# Patient Record
Sex: MALE | Race: OTHER | NOT HISPANIC OR LATINO | URBAN - METROPOLITAN AREA
[De-identification: names, ages, dates, MRNs, and addresses within clinical notes are randomized per-mention and may not be internally consistent; named-entity substitution may affect disease eponyms.]

---

## 2021-09-10 ENCOUNTER — EMERGENCY (EMERGENCY)
Facility: HOSPITAL | Age: 36
LOS: 1 days | Discharge: HOME | End: 2021-09-10
Attending: EMERGENCY MEDICINE | Admitting: EMERGENCY MEDICINE
Payer: SELF-PAY

## 2021-09-10 VITALS
WEIGHT: 194.01 LBS | OXYGEN SATURATION: 98 % | RESPIRATION RATE: 18 BRPM | HEIGHT: 72.05 IN | TEMPERATURE: 101 F | HEART RATE: 102 BPM | DIASTOLIC BLOOD PRESSURE: 89 MMHG | SYSTOLIC BLOOD PRESSURE: 141 MMHG

## 2021-09-10 DIAGNOSIS — U07.1 COVID-19: ICD-10-CM

## 2021-09-10 DIAGNOSIS — R50.9 FEVER, UNSPECIFIED: ICD-10-CM

## 2021-09-10 LAB
ALBUMIN SERPL ELPH-MCNC: 4.6 G/DL — SIGNIFICANT CHANGE UP (ref 3.3–5)
ALP SERPL-CCNC: 80 U/L — SIGNIFICANT CHANGE UP (ref 40–120)
ALT FLD-CCNC: 22 U/L — SIGNIFICANT CHANGE UP (ref 10–45)
ANION GAP SERPL CALC-SCNC: 10 MMOL/L — SIGNIFICANT CHANGE UP (ref 5–17)
APPEARANCE UR: CLEAR — SIGNIFICANT CHANGE UP
AST SERPL-CCNC: 24 U/L — SIGNIFICANT CHANGE UP (ref 10–40)
BASOPHILS # BLD AUTO: 0.02 K/UL — SIGNIFICANT CHANGE UP (ref 0–0.2)
BASOPHILS NFR BLD AUTO: 0.3 % — SIGNIFICANT CHANGE UP (ref 0–2)
BILIRUB SERPL-MCNC: 0.3 MG/DL — SIGNIFICANT CHANGE UP (ref 0.2–1.2)
BILIRUB UR-MCNC: NEGATIVE — SIGNIFICANT CHANGE UP
BUN SERPL-MCNC: 23 MG/DL — SIGNIFICANT CHANGE UP (ref 7–23)
CALCIUM SERPL-MCNC: 9.3 MG/DL — SIGNIFICANT CHANGE UP (ref 8.4–10.5)
CHLORIDE SERPL-SCNC: 103 MMOL/L — SIGNIFICANT CHANGE UP (ref 96–108)
CO2 SERPL-SCNC: 23 MMOL/L — SIGNIFICANT CHANGE UP (ref 22–31)
COLOR SPEC: YELLOW — SIGNIFICANT CHANGE UP
CREAT SERPL-MCNC: 1.07 MG/DL — SIGNIFICANT CHANGE UP (ref 0.5–1.3)
CRP SERPL-MCNC: 9.6 MG/L — HIGH (ref 0–4)
D DIMER BLD IA.RAPID-MCNC: <150 NG/ML DDU — SIGNIFICANT CHANGE UP
DIFF PNL FLD: NEGATIVE — SIGNIFICANT CHANGE UP
EOSINOPHIL # BLD AUTO: 0.01 K/UL — SIGNIFICANT CHANGE UP (ref 0–0.5)
EOSINOPHIL NFR BLD AUTO: 0.1 % — SIGNIFICANT CHANGE UP (ref 0–6)
FERRITIN SERPL-MCNC: 280 NG/ML — SIGNIFICANT CHANGE UP (ref 30–400)
GLUCOSE SERPL-MCNC: 88 MG/DL — SIGNIFICANT CHANGE UP (ref 70–99)
GLUCOSE UR QL: NEGATIVE — SIGNIFICANT CHANGE UP
HCT VFR BLD CALC: 45.3 % — SIGNIFICANT CHANGE UP (ref 39–50)
HGB BLD-MCNC: 15.1 G/DL — SIGNIFICANT CHANGE UP (ref 13–17)
IMM GRANULOCYTES NFR BLD AUTO: 0.5 % — SIGNIFICANT CHANGE UP (ref 0–1.5)
KETONES UR-MCNC: 15 MG/DL
LEUKOCYTE ESTERASE UR-ACNC: NEGATIVE — SIGNIFICANT CHANGE UP
LYMPHOCYTES # BLD AUTO: 0.82 K/UL — LOW (ref 1–3.3)
LYMPHOCYTES # BLD AUTO: 10.3 % — LOW (ref 13–44)
MCHC RBC-ENTMCNC: 29.9 PG — SIGNIFICANT CHANGE UP (ref 27–34)
MCHC RBC-ENTMCNC: 33.3 GM/DL — SIGNIFICANT CHANGE UP (ref 32–36)
MCV RBC AUTO: 89.7 FL — SIGNIFICANT CHANGE UP (ref 80–100)
MONOCYTES # BLD AUTO: 1.3 K/UL — HIGH (ref 0–0.9)
MONOCYTES NFR BLD AUTO: 16.4 % — HIGH (ref 2–14)
NEUTROPHILS # BLD AUTO: 5.76 K/UL — SIGNIFICANT CHANGE UP (ref 1.8–7.4)
NEUTROPHILS NFR BLD AUTO: 72.4 % — SIGNIFICANT CHANGE UP (ref 43–77)
NITRITE UR-MCNC: NEGATIVE — SIGNIFICANT CHANGE UP
NRBC # BLD: 0 /100 WBCS — SIGNIFICANT CHANGE UP (ref 0–0)
PH UR: 6 — SIGNIFICANT CHANGE UP (ref 5–8)
PLATELET # BLD AUTO: 177 K/UL — SIGNIFICANT CHANGE UP (ref 150–400)
POTASSIUM SERPL-MCNC: 4.4 MMOL/L — SIGNIFICANT CHANGE UP (ref 3.5–5.3)
POTASSIUM SERPL-SCNC: 4.4 MMOL/L — SIGNIFICANT CHANGE UP (ref 3.5–5.3)
PROCALCITONIN SERPL-MCNC: 0.1 NG/ML — SIGNIFICANT CHANGE UP (ref 0.02–0.1)
PROT SERPL-MCNC: 7.8 G/DL — SIGNIFICANT CHANGE UP (ref 6–8.3)
PROT UR-MCNC: NEGATIVE MG/DL — SIGNIFICANT CHANGE UP
RBC # BLD: 5.05 M/UL — SIGNIFICANT CHANGE UP (ref 4.2–5.8)
RBC # FLD: 12.9 % — SIGNIFICANT CHANGE UP (ref 10.3–14.5)
SODIUM SERPL-SCNC: 136 MMOL/L — SIGNIFICANT CHANGE UP (ref 135–145)
SP GR SPEC: 1.02 — SIGNIFICANT CHANGE UP (ref 1–1.03)
UROBILINOGEN FLD QL: 0.2 E.U./DL — SIGNIFICANT CHANGE UP
WBC # BLD: 7.95 K/UL — SIGNIFICANT CHANGE UP (ref 3.8–10.5)
WBC # FLD AUTO: 7.95 K/UL — SIGNIFICANT CHANGE UP (ref 3.8–10.5)

## 2021-09-10 PROCEDURE — 71045 X-RAY EXAM CHEST 1 VIEW: CPT | Mod: 26

## 2021-09-10 PROCEDURE — 99285 EMERGENCY DEPT VISIT HI MDM: CPT | Mod: 25

## 2021-09-10 PROCEDURE — 85025 COMPLETE CBC W/AUTO DIFF WBC: CPT

## 2021-09-10 PROCEDURE — 0225U NFCT DS DNA&RNA 21 SARSCOV2: CPT

## 2021-09-10 PROCEDURE — 93010 ELECTROCARDIOGRAM REPORT: CPT

## 2021-09-10 PROCEDURE — U0005: CPT

## 2021-09-10 PROCEDURE — 71045 X-RAY EXAM CHEST 1 VIEW: CPT

## 2021-09-10 PROCEDURE — U0003: CPT

## 2021-09-10 PROCEDURE — 82728 ASSAY OF FERRITIN: CPT

## 2021-09-10 PROCEDURE — 84145 PROCALCITONIN (PCT): CPT

## 2021-09-10 PROCEDURE — 93005 ELECTROCARDIOGRAM TRACING: CPT

## 2021-09-10 PROCEDURE — 86140 C-REACTIVE PROTEIN: CPT

## 2021-09-10 PROCEDURE — 36415 COLL VENOUS BLD VENIPUNCTURE: CPT

## 2021-09-10 PROCEDURE — 85379 FIBRIN DEGRADATION QUANT: CPT

## 2021-09-10 PROCEDURE — 81003 URINALYSIS AUTO W/O SCOPE: CPT

## 2021-09-10 PROCEDURE — 80053 COMPREHEN METABOLIC PANEL: CPT

## 2021-09-10 RX ORDER — SODIUM CHLORIDE 9 MG/ML
1000 INJECTION INTRAMUSCULAR; INTRAVENOUS; SUBCUTANEOUS ONCE
Refills: 0 | Status: COMPLETED | OUTPATIENT
Start: 2021-09-10 | End: 2021-09-10

## 2021-09-10 RX ORDER — IBUPROFEN 200 MG
600 TABLET ORAL ONCE
Refills: 0 | Status: COMPLETED | OUTPATIENT
Start: 2021-09-10 | End: 2021-09-10

## 2021-09-10 RX ORDER — ACETAMINOPHEN 500 MG
650 TABLET ORAL ONCE
Refills: 0 | Status: COMPLETED | OUTPATIENT
Start: 2021-09-10 | End: 2021-09-10

## 2021-09-10 RX ADMIN — SODIUM CHLORIDE 1000 MILLILITER(S): 9 INJECTION INTRAMUSCULAR; INTRAVENOUS; SUBCUTANEOUS at 21:04

## 2021-09-10 RX ADMIN — Medication 600 MILLIGRAM(S): at 23:02

## 2021-09-10 RX ADMIN — Medication 650 MILLIGRAM(S): at 21:04

## 2021-09-10 NOTE — ED PROVIDER NOTE - OBJECTIVE STATEMENT
35 y/o M pt with no pertinent PMHx and PSHx presents to ED c/o fever today. Pt also has congestion, but denies any cough or shortness of breath. He was exposed to someone who was positive for covid last recently, and reports being vaccinated for covid last Tuesday with [employer]. Pt is febrile in ED, but ao x 3 and not in any distress.

## 2021-09-10 NOTE — ED PROVIDER NOTE - NSFOLLOWUPINSTRUCTIONS_ED_ALL_ED_FT
Síndrome viral  LO QUE NECESITAS SABER:  El síndrome viral es un término que se usa para los síntomas de [place] infección causada por un virus. Los virus se transmiten fácilmente de persona a persona a través del aire y en elementos compartidos.  INSTRUCCIONES DE DESCARGA:  Llame a ashby número de emergencia local (911 en los [place] O pídale a otra persona que llame si:  • Tiene convulsiones.  • No te pueden despertar.  • Tiene dolor de pecho o dificultad para respirar.  Busque atención médica de inmediato si:  • Tiene rigidez en el srinivasan, dolor de fabio intenso y sensibilidad a la belle.  • Se siente débil, mareado o confundido.  • Gris de orinar o orina mucho menos de lo habitual.  • Tose earl o [place] mucosidad espesa de color amarillo o mami.  • Tiene dolor abdominal severo o ashby abdomen es más valdo de lo normal.    Llame a ashby médico si:  • Sonia síntomas no mejoran con el tratamiento o empeoran después de 3 días.  • Tiene sarpullido o dolor de oído.  • Tiene ardor al orinar.  • Tiene preguntas o inquietudes sobre ashby afección o atención.  Medicamentos: es posible que necesite alguno de los siguientes:  • El acetaminofén reduce el dolor y la fiebre. Está disponible sin receta médica. Pregunte cuánto debe de y con qué frecuencia. Seguir direcciones. Simone las etiquetas de todos los demás medicamentos que esté usando para robson si también contienen acetaminofén, o pregunte a ashby médico o farmacéutico. El acetaminofén puede causar daño hepático si no se melissa correctamente. No use más de 4 gramos (4,000 miligramos) en total de acetaminofén en un día.  • Los SG, angela el ibuprofeno, ayudan a disminuir la hinchazón, el dolor y la fiebre. Los SG pueden causar hemorragia estomacal o problemas renales en determinadas personas. Si melissa medicamentos anticoagulantes, pregúntele siempre a ashby proveedor de atención médica si los SG son seguros para usted. Siempre simone la etiqueta del medicamento y siga las instrucciones.  • Los medicamentos para el resfriado ayudan a disminuir la hinchazón, controlar la tos y aliviar la congestión nasal o del pecho.  • El aerosol nasal salino ayuda a disminuir la congestión nasal.  • [place] ashby medicamento según las indicaciones. Comuníquese con ashby proveedor de atención médica si leno que ashby medicamento no le está ayudando o si tiene efectos secundarios. Dígale de grabiel si es alérgico a algún medicamento. Mantenga [place] lista de los medicamentos, vitaminas y hierbas que melissa. Incluya las cantidades, cuándo y por qué las melissa. Lleve la lista o los frascos de pastillas a las visitas de seguimiento. Lleve consigo ashby lista de medicamentos en armando de [place] emergencia.    Maneje sonia síntomas:  • Aruna líquidos según las indicaciones para prevenir la deshidratación. Pregunte cuánto líquido debe beber cada día y qué líquidos son mejores para usted. Pregunte si debe beber [place] solución de rehidratación oral (SRO). [place] SRO tiene las cantidades adecuadas de agua, sales y azúcar que necesita para reemplazar los fluidos corporales. [place] puede ayudar a prevenir la deshidratación causada por vómitos o diarrea. No aruna líquidos con cafeína. Los líquidos con cafeína pueden empeorar la deshidratación.  • Descanse lo suficiente para ayudar a ashby cuerpo a sanar. Melissa siestas ly todo el día. Pregúntele a ashby proveedor de atención médica cuándo puede regresar al trabajo y a sonia actividades normales.  • Use un humidificador de vapor frío para ayudarlo a respirar mejor. Pregúntele a ashby proveedor de atención médica cómo usar un humidificador de vapor frío.  • Coma miel o use pastillas para la tos para el dolor de garganta. Las pastillas para la tos están disponibles sin receta médica. Siga las instrucciones para de pastillas para la tos.  • No fume ni se acerque a nadie que esté fumando. La nicotina y otras sustancias químicas de los cigarrillos y puros pueden causar daño pulmonar. Fumar también puede retrasar la curación. Pídale información a ashby proveedor de atención médica si actualmente fuma y necesita ayuda para dejar de fumar. Los cigarrillos electrónicos o el tabaco sin humo todavía contienen nicotina. Hable con ashby proveedor de atención médica antes de usar estos productos.  Prevenga la propagación de gérmenes:  • Lávese las bhupinder con frecuencia. Lávese las bhupinder varias veces al día. Lávese después de ir al baño, cambie el pañal de un mare y antes de preparar o comer alimentos. Use agua y jabón todo el tiempo. Frótese las bhupinder enjabonadas entrelazando los dedos. Lávese el frente y el dorso de las bhupinder y entre los dedos. Use los dedos de [place] mano para frotar debajo de las uñas de la otra mano. Lave ly al menos 20 segundos. Enjuague con agua corriente tibia ly varios segundos. Luego séquese las bhupinder con [place] toalla limpia o [place] toalla de papel. Use un desinfectante para bhupinder que contenga alcohol si no hay agua y jabón disponibles. No se toque los ojos, la nariz o la boca sin antes lavarse las bhupinder.    Lavado de bhupinder  • Cubra un estornudo o tos. Use un pañuelo que cubra ashby boca y nariz. Deseche el pañuelo de papel en un bote de basura de inmediato. Utilice la curva de ashby brazo si no dispone de un pañuelo. Lávese aide las bhupinder con agua y jabón o use un desinfectante para bhupinder.  • Manténgase alejado de los demás mientras esté enfermo. Evite las multitudes tanto angela sea posible.  • Pregunte sobre las vacunas que pueda necesitar. Hable con ashby proveedor de atención médica sobre ashby historial de vacunas. Él o grabiel le dirá qué vacunas necesita y cuándo debe recibirlas. ° Obtenga la vacuna contra la influenza (gripe) tan pronto angela se lo recomiende cada año. La vacuna contra la gripe está disponible a partir de septiembre u octubre. Los virus de la influenza cambian, por lo que es importante vacunarse contra la influenza todos los años.  ? Obtenga la vacuna contra la neumonía si se recomienda. Esta vacuna generalmente se recomienda cada 5 años. Ashby proveedor le dirá cuándo debe recibir esta vacuna, si es necesario.  Sindhu un seguimiento con ashby médico según las indicaciones: escriba sonia preguntas para que recuerde hacerlas ly sonia visitas.

## 2021-09-10 NOTE — ED ADULT TRIAGE NOTE - CHIEF COMPLAINT QUOTE
patient c/o fever , headache and nasal congestion started this am . Had covid vaccine last tuesday . Pt. got exposed to person with covid last tuesday .

## 2021-09-10 NOTE — ED PROVIDER NOTE - PATIENT PORTAL LINK FT
You can access the FollowMyHealth Patient Portal offered by [facility] by registering at the following website: [email or web address]. By joining [employer]’s FollowMyHealth portal, you will also be able to view your health information using other applications (apps) compatible with our system.

## 2021-09-10 NOTE — ED PROVIDER NOTE - CLINICAL SUMMARY MEDICAL DECISION MAKING FREE TEXT BOX
35 y/o M pt presents to ED with fever in setting of exposure to covid. Plan for labs, covid swab, CXR, EKG, pt given Ibuprofen and Tylenol for fever, and reassess. 35 y/o M pt presents to ED with acute febrile illness and viral syndrome. Plan for labs, given IV fluids, Tylenol, check CXR, and reassess. On reevaluation, pt with no hypoxia, no shortness of breath, ao x 3, and ambulatory at baseline with fever. Pt given oral Ibuprofen.

## 2021-09-10 NOTE — ED ADULT NURSE NOTE - OBJECTIVE STATEMENT
pt c/o fever, nasal congestion and headache , had covid vaccine last Tuesday , possible covid exposure also

## 2021-09-11 VITALS
TEMPERATURE: 99 F | HEART RATE: 74 BPM | DIASTOLIC BLOOD PRESSURE: 57 MMHG | RESPIRATION RATE: 17 BRPM | OXYGEN SATURATION: 97 % | SYSTOLIC BLOOD PRESSURE: 109 MMHG

## 2021-09-11 LAB — SARS-COV-2 RNA SPEC QL NAA+PROBE: DETECTED

## 2023-03-25 NOTE — ED ADULT TRIAGE NOTE - HEART RATE (BEATS/MIN)
You are seen at the [facility] emergency department for a laceration, or cut, to your right thumb. This was cleaned and splinted while you were in the emergency department. You should follow-up with Dr. Merryl Bloch in the orthopedic surgery clinic next Monday or Tuesday. The phone number and address for this clinic is attached to these discharge instructions. Please call the number to schedule this appointment as soon as possible. You have been given a prescription for an antibiotic called Keflex. Please pick this up from your pharmacy and take every 6 hours for 5 days. You should return to the emergency department if you experience pain/redness spreading down your hand or arm, discharge from your wound, or any other symptoms that concern you. 102